# Patient Record
Sex: FEMALE | NOT HISPANIC OR LATINO | Employment: OTHER | URBAN - METROPOLITAN AREA
[De-identification: names, ages, dates, MRNs, and addresses within clinical notes are randomized per-mention and may not be internally consistent; named-entity substitution may affect disease eponyms.]

---

## 2024-08-13 ENCOUNTER — DOCUMENTATION ONLY (OUTPATIENT)
Dept: GERIATRICS | Facility: CLINIC | Age: 89
End: 2024-08-13

## 2024-08-15 ENCOUNTER — ASSISTED LIVING VISIT (OUTPATIENT)
Dept: GERIATRICS | Facility: CLINIC | Age: 89
End: 2024-08-15
Payer: MEDICARE

## 2024-08-15 VITALS
RESPIRATION RATE: 18 BRPM | HEART RATE: 66 BPM | SYSTOLIC BLOOD PRESSURE: 125 MMHG | DIASTOLIC BLOOD PRESSURE: 60 MMHG | OXYGEN SATURATION: 97 % | WEIGHT: 175 LBS

## 2024-08-15 DIAGNOSIS — Z71.89 ACP (ADVANCE CARE PLANNING): ICD-10-CM

## 2024-08-15 DIAGNOSIS — I48.0 PAROXYSMAL ATRIAL FIBRILLATION (H): Primary | ICD-10-CM

## 2024-08-15 DIAGNOSIS — I10 HYPERTENSION, UNSPECIFIED TYPE: ICD-10-CM

## 2024-08-15 DIAGNOSIS — M17.0 PRIMARY OSTEOARTHRITIS OF BOTH KNEES: ICD-10-CM

## 2024-08-15 DIAGNOSIS — I50.22 CHRONIC SYSTOLIC HEART FAILURE (H): ICD-10-CM

## 2024-08-15 DIAGNOSIS — I82.402 DEEP VEIN THROMBOSIS (DVT) OF LEFT LOWER EXTREMITY, UNSPECIFIED CHRONICITY, UNSPECIFIED VEIN (H): ICD-10-CM

## 2024-08-15 PROCEDURE — 99344 HOME/RES VST NEW MOD MDM 60: CPT | Performed by: NURSE PRACTITIONER

## 2024-08-15 RX ORDER — AMLODIPINE BESYLATE 5 MG/1
5 TABLET ORAL DAILY
COMMUNITY

## 2024-08-15 RX ORDER — ACETAMINOPHEN 325 MG/1
650 TABLET ORAL EVERY 8 HOURS PRN
COMMUNITY
End: 2024-08-15

## 2024-08-15 RX ORDER — CALCIUM CARBONATE/VITAMIN D3 600 MG-10
1 TABLET ORAL DAILY
COMMUNITY

## 2024-08-15 RX ORDER — POTASSIUM CHLORIDE 1500 MG/1
20 TABLET, EXTENDED RELEASE ORAL DAILY
COMMUNITY

## 2024-08-15 RX ORDER — DILTIAZEM HYDROCHLORIDE 240 MG/1
240 CAPSULE, EXTENDED RELEASE ORAL DAILY
COMMUNITY

## 2024-08-15 RX ORDER — METOPROLOL SUCCINATE 200 MG/1
200 TABLET, EXTENDED RELEASE ORAL DAILY
COMMUNITY
End: 2024-10-03

## 2024-08-15 RX ORDER — FUROSEMIDE 20 MG
20 TABLET ORAL DAILY
COMMUNITY

## 2024-08-15 RX ORDER — ACETAMINOPHEN 500 MG
500 TABLET ORAL EVERY MORNING
COMMUNITY

## 2024-08-15 NOTE — PROGRESS NOTES
[place] [facility]  PRIMARY CARE PROVIDER AND CLINIC:  Jossue Palomo, APRN CNP, [address] [facility] / [facility] [address]  Chief Complaint   Patient presents with    [facility]     [facility] Record Number:  [id number]  Place of Service where encounter took place:  [facility]  [facility] ([facility]) [[id number]]    Hilda Sol  is a 89 year old  (11/14/1931), admitted to the above facility from home..  Admitted to this facility for  medical management and nursing care.    HPI:    HPI information obtained from: facility chart records, facility staff, patient report, [facility] chart review, and family/first contact granddtr report.   Brief Summary of Hospital Course:     A-fib. HR stable. Has h/o of possible TIA, Neuros stable. Cont. On toprol xl, eliquis. No recent falls. Last hgb stable.     DVT. LLE s/p thrombectomy 8/21.  Has h/o LE cellulitis. Cont. On eliquis as above. Has venous insuff. Of LLE.     HTN: BP stable. Cont. On cardizem, norvasc. Last bmp stable.    HF: EF 60%. Resp. Status stable. Ongoing LE edema. Reports gradual wt. Loss. Cont. On lasix, kcl.     OA knees. Ongoing pain. Gen. Managed with tylenol, biofreeze. Reports knee inj. Not effective. Has slowed gait, favors LLE. Short steps. Slow to stand. Uses walker. When out of apt uses w.c.        Updates on Status Since Skilled nursing Admission: po intake stable. Occ overnight urinary incont.     CODE STATUS/ADVANCE DIRECTIVES DISCUSSION:   DNR / DNI  Patient's living condition: lives in an assisted living facility  ALLERGIES: Patient has no known allergies.  PAST MEDICAL HISTORY:  has a past medical history of Chronic systolic heart failure (H), CKD (chronic kidney disease) stage 3, GFR 30-59 ml/min (H), DVT (deep venous thrombosis) (H), HTN (hypertension), Paroxysmal atrial fibrillation (H), and Venous (peripheral) insufficiency.  PAST SURGICAL HISTORY:   has a past surgical history that includes  Thrombectomy lower extremity.  FAMILY HISTORY: family history is not on file.  SOCIAL HISTORY:       Post Discharge Medication Reconciliation Status: discharge medications reconciled and changed, per note/orders    Current Outpatient Medications   Medication Sig Dispense Refill    acetaminophen (TYLENOL) 500 MG tablet Take 500 mg by mouth every morning And every day prn      amLODIPine (NORVASC) 5 MG tablet Take 5 mg by mouth daily      apixaban ANTICOAGULANT (ELIQUIS) 5 MG tablet Take 5 mg by mouth 2 times daily      calcium carbonate-vitamin D (CALTRATE) 600-10 MG-MCG per tablet Take 1 tablet by mouth daily      diltiazem ER (DILT-XR) 240 MG 24 hr ER beaded capsule Take 240 mg by mouth daily      furosemide (LASIX) 20 MG tablet Take 20 mg by mouth daily      menthol (ICY HOT) 5 % PTCH Apply 1 patch topically as needed for muscle soreness      metoprolol succinate ER (TOPROL XL) 200 MG 24 hr tablet Take 200 mg by mouth daily      potassium chloride ER (K-TAB) 20 MEQ CR tablet Take 20 mEq by mouth daily           ROS:  CONSTITUTIONAL:  weight loss and fatigue, EYES:  decreased vision L eye, ENT:  [place], CV:  lower extremity edema, RESPIRATORY: neg, :  incontinence, GI:  neg, NEURO:  neg, PSYCH: neg, and MUSCULOSKELETAL: joint pain and muscular weakness    Vitals:  /60   Pulse 66   Resp 18   Wt 79.4 kg (175 lb)   SpO2 97%   Exam:  GENERAL APPEARANCE:  Alert, in no distress, cooperative  ENT:  Mouth and posterior oropharynx normal, moist mucous membranes, [place], adequate dentition  EYES:  PERRL, EOM normal, opacity to L eye. No drainage  NECK:  No adenopathy,masses or thyromegaly, no carotid bruit, FROM  RESP:  respiratory effort and palpation of chest normal, lungs clear to auscultation , no respiratory distress  CV:  Palpation and auscultation of heart done , peripheral edema 3+ in LEs, rate-normal, grade 2/6 murmur  ABDOMEN:  normal bowel sounds, soft, nontender, no hepatosplenomegaly or other masses, no  guarding or rebound, no bruits  M/S:   muscle strength 5/5 all 4 ext. With gen. LE weakness. Slow to stand. Slow, sl shuffling gait, favors LLE  NEURO:   Cranial nerves 2-12 are normal tested and grossly at patient's baseline, speech fluid, no tremor  PSYCH:  memory impaired , affect and mood normal, no apparent anxiety    Lab/Diagnostic data:  Recent labs in [facility] reviewed by me today.     ASSESSMENT/PLAN:  (I48.0) Paroxysmal atrial fibrillation (H)  (primary encounter diagnosis)  Comment: HR stable.  Plan: 1. Cont. Toprol xl  2. Follow Hrs  3. Monitor for dizziness, resp. Distress  4. Cbc next week    (I82.402) Deep vein thrombosis (DVT) of left lower extremity, unspecified chronicity, unspecified vein (H)  Comment: s/p thrombectomy, no redness of Les. Venous insuff. LLE  Plan: 1. Cont. Eliquis  2. Monitor for LE pain, redness    (I10) Hypertension, unspecified type  Comment: BP stable. CKD stage 3  Plan: 1. Cont. Cardizem  2. Cont. Norvasc  3. Follow Bps  4. Bmp next week    (I50.22) Chronic systolic heart failure (H)  Comment: resp. Status stable. Preserved EF. Ongoing LE edema  Plan: 1. Cont. Lasix, kcl  2. Refer to OT for compression stockings  3. Follow wt.s    (M17.0) Primary osteoarthritis of both knees  Comment: ongoing bilat knee pain, slowed gait  Plan: 1. Refer to PT  2. Cont. Tylenol, biofreeze  3. Encourage amb as chan    (Z71.89) ACP (advance care planning)  Comment: spoke with residentjules.  Plan: 1 DNR/DNI. POLST in chart          Electronically signed by:  DOLLY Moore CNP         Documentation of Face-to-Face and Certification for Home Health Services     Patient: Hilda Sol   YOB: 1931  MR Number: [id number]  Today's Date: 8/15/2024    I certify that patient: Hilda Sol is under my care and that I, or a nurse practitioner or physician's assistant working with me, had a face-to-face encounter that meets the physician face-to-face encounter  requirements with this patient on: 8/15/2024.    This encounter with the patient was in whole, or in part, for the following medical condition, which is the primary reason for home health care: OA knee, LE edema.    I certify that, based on my findings, the following services are medically necessary home health services: Occupational Therapy and Physical Therapy.    My clinical findings support the need for the above services because: Occupational Therapy Services are needed to assess and treat cognitive ability and address ADL safety due to impairment in LE edema. and Physical Therapy Services are needed to assess and treat the following functional impairments: knee pain, decreased amb.    Further, I certify that my clinical findings support that this patient is homebound (i.e. absences from home require considerable and taxing effort and are for medical reasons or [employer] services or infrequently or of short duration when for other reasons) because: Requires assistance of another person or specialized equipment to access medical services because patient: Is unable to walk greater than 20 feet without rest...    Based on the above findings. I certify that this patient is confined to the home and needs intermittent skilled nursing care, physical therapy and/or speech therapy.  The patient is under my care, and I have initiated the establishment of the plan of care.  This patient will be followed by a physician who will periodically review the plan of care.  Physician/Provider to provide follow up care: Jossue Palomo    Responsible Medicare certified [place] Physician: Ita Simpson  Physician Signature: See electronic signature associated with these discharge orders.  Date: 8/15/2024

## 2024-08-16 ENCOUNTER — LAB REQUISITION (OUTPATIENT)
Dept: LAB | Facility: CLINIC | Age: 89
End: 2024-08-16
Payer: MEDICARE

## 2024-08-16 DIAGNOSIS — I10 ESSENTIAL (PRIMARY) HYPERTENSION: ICD-10-CM

## 2024-08-20 LAB
ANION GAP SERPL CALCULATED.3IONS-SCNC: 14 MMOL/L (ref 7–15)
BUN SERPL-MCNC: 33.7 MG/DL (ref 8–23)
CALCIUM SERPL-MCNC: 9.6 MG/DL (ref 8.8–10.4)
CHLORIDE SERPL-SCNC: 104 MMOL/L (ref 98–107)
CREAT SERPL-MCNC: 1.21 MG/DL (ref 0.51–0.95)
EGFRCR SERPLBLD CKD-EPI 2021: 42 ML/MIN/1.73M2
ERYTHROCYTE [DISTWIDTH] IN BLOOD BY AUTOMATED COUNT: 12.9 % (ref 10–15)
GLUCOSE SERPL-MCNC: 95 MG/DL (ref 70–99)
HCO3 SERPL-SCNC: 23 MMOL/L (ref 22–29)
HCT VFR BLD AUTO: 41.4 % (ref 35–47)
HGB BLD-MCNC: 13.3 G/DL (ref 11.7–15.7)
MCH RBC QN AUTO: 32.5 PG (ref 26.5–33)
MCHC RBC AUTO-ENTMCNC: 32.1 G/DL (ref 31.5–36.5)
MCV RBC AUTO: 101 FL (ref 78–100)
PLATELET # BLD AUTO: 234 10E3/UL (ref 150–450)
POTASSIUM SERPL-SCNC: 5.1 MMOL/L (ref 3.4–5.3)
RBC # BLD AUTO: 4.09 10E6/UL (ref 3.8–5.2)
SODIUM SERPL-SCNC: 141 MMOL/L (ref 135–145)
WBC # BLD AUTO: 8.8 10E3/UL (ref 4–11)

## 2024-08-20 PROCEDURE — 85027 COMPLETE CBC AUTOMATED: CPT | Mod: ORL | Performed by: NURSE PRACTITIONER

## 2024-08-20 PROCEDURE — 36415 COLL VENOUS BLD VENIPUNCTURE: CPT | Mod: ORL | Performed by: NURSE PRACTITIONER

## 2024-08-20 PROCEDURE — 80048 BASIC METABOLIC PNL TOTAL CA: CPT | Mod: ORL | Performed by: NURSE PRACTITIONER

## 2024-08-20 PROCEDURE — P9603 ONE-WAY ALLOW PRORATED MILES: HCPCS | Mod: ORL | Performed by: NURSE PRACTITIONER

## 2024-08-31 DIAGNOSIS — Z78.9 TAKES DIETARY SUPPLEMENTS: Primary | ICD-10-CM

## 2024-09-03 RX ORDER — CALCIUM CARBONATE/VITAMIN D3 600MG-5MCG
1 TABLET ORAL DAILY
Qty: 90 TABLET | Refills: 97 | Status: SHIPPED | OUTPATIENT
Start: 2024-09-03

## 2024-10-03 DIAGNOSIS — I10 HTN (HYPERTENSION): ICD-10-CM

## 2024-10-03 DIAGNOSIS — I48.0 PAROXYSMAL ATRIAL FIBRILLATION (H): Primary | ICD-10-CM

## 2024-10-03 RX ORDER — METOPROLOL SUCCINATE 200 MG/1
200 TABLET, EXTENDED RELEASE ORAL DAILY
Qty: 90 TABLET | Refills: 3 | Status: SHIPPED | OUTPATIENT
Start: 2024-10-03

## 2024-10-03 RX ORDER — APIXABAN 5 MG/1
5 TABLET, FILM COATED ORAL 2 TIMES DAILY
Qty: 180 TABLET | Refills: 3 | Status: SHIPPED | OUTPATIENT
Start: 2024-10-03

## 2024-10-03 RX ORDER — POTASSIUM CHLORIDE 1500 MG/1
20 TABLET, EXTENDED RELEASE ORAL DAILY
Qty: 90 TABLET | Refills: 3 | Status: SHIPPED | OUTPATIENT
Start: 2024-10-03

## 2024-10-10 ENCOUNTER — ASSISTED LIVING VISIT (OUTPATIENT)
Dept: GERIATRICS | Facility: CLINIC | Age: 89
End: 2024-10-10
Payer: COMMERCIAL

## 2024-10-10 VITALS
DIASTOLIC BLOOD PRESSURE: 56 MMHG | RESPIRATION RATE: 18 BRPM | HEART RATE: 74 BPM | OXYGEN SATURATION: 95 % | SYSTOLIC BLOOD PRESSURE: 126 MMHG

## 2024-10-10 DIAGNOSIS — I50.22 CHRONIC SYSTOLIC HEART FAILURE (H): Primary | ICD-10-CM

## 2024-10-10 DIAGNOSIS — M62.81 GENERALIZED MUSCLE WEAKNESS: ICD-10-CM

## 2024-10-10 DIAGNOSIS — I48.0 PAROXYSMAL ATRIAL FIBRILLATION (H): ICD-10-CM

## 2024-10-10 PROCEDURE — 99349 HOME/RES VST EST MOD MDM 40: CPT | Performed by: NURSE PRACTITIONER

## 2024-10-10 NOTE — LETTER
10/10/2024      Hilda Sol  [address]  [place] [address]        [place] [facility]  [facility] Record Number:  [id number]  Place of Service where encounter took place:  [facility]  [facility] ([facility]) [[id number]]  Chief Complaint   Patient presents with     Edema       HPI:    Hilda Sol  is a 89 year old (11/14/1931), who is being seen today for an episodic care visit.  HPI information obtained from: facility chart records, facility staff, patient report, and [facility] chart review. Today's concern is:  HF, a-fib, weakness. +Covid earlier this month. Cont. On isolation. Has had some increased weakness. Fall last week. No apparent inj. Reports strength more baseline. Completed paxlovid course. For a-fib cont. On eliquis, toprol xl. HR stable. No reports of dizziness.     Past Medical and Surgical History reviewed in Epic today.    MEDICATIONS:    Current Outpatient Medications   Medication Sig Dispense Refill     acetaminophen (TYLENOL) 500 MG tablet Take 500 mg by mouth every morning And every day prn       amLODIPine (NORVASC) 5 MG tablet Take 5 mg by mouth daily       CALCIUM + VITAMIN D3 600-5 MG-MCG TABS TAKE 1 TABLET BY MOUTH ONCE DAILY 90 tablet 97     calcium carbonate-vitamin D (CALTRATE) 600-10 MG-MCG per tablet Take 1 tablet by mouth daily       diltiazem ER (DILT-XR) 240 MG 24 hr ER beaded capsule Take 240 mg by mouth daily       ELIQUIS ANTICOAGULANT 5 MG tablet TAKE 1 TABLET BY MOUTH TWICE DAILY 180 tablet 3     furosemide (LASIX) 20 MG tablet Take 20 mg by mouth daily       menthol (ICY HOT) 5 % PTCH Apply 1 patch topically as needed for muscle soreness       metoprolol succinate ER (TOPROL XL) 200 MG 24 hr tablet TAKE 1 TABLET BY MOUTH ONCE DAILY 90 tablet 3     potassium chloride angelika ER (KLOR-CON M20) 20 MEQ CR tablet TAKE 1 TABLET BY MOUTH ONCE DAILY 90 tablet 3         REVIEW OF SYSTEMS:  No chest pain, shortness of breath, fevers, chills,  headache, nausea, vomiting, dysuria or bowel abnormalities.  Appetite is  normal.  No pain except occ aches.    Objective:  /56   Pulse 74   Resp 18   SpO2 95%   Exam:  GENERAL APPEARANCE:  Alert, in no distress, cooperative  ENT:  Mouth and posterior oropharynx normal, moist mucous membranes, [place]  EYES:  PERRL, EOM normal, R eye lower lid ectropion   RESP:  respiratory effort and palpation of chest normal, lungs clear to auscultation , no respiratory distress, diminished breath sounds bibasilar  CV:  Palpation and auscultation of heart done , peripheral edema 1-2+ in LEs, rate-normal, grade 3/6 murmur  ABDOMEN:  normal bowel sounds, soft, nontender, no hepatosplenomegaly or other masses, no guarding or rebound  M/S:   muscle strength 5/5 all 4 ext., normal tone  NEURO:   Cranial nerves 2-12 are normal tested and grossly at patient's baseline, speech fluid  PSYCH:  affect and mood normal, no apparent anxiety    Labs:   Most Recent 3 CBC's:  Recent Labs   Lab Test 08/20/24  1116   WBC 8.8   HGB 13.3   *        Most Recent 3 BMP's:  Recent Labs   Lab Test 08/20/24  1116      POTASSIUM 5.1   CHLORIDE 104   CO2 23   BUN 33.7*   CR 1.21*   ANIONGAP 14   NIK 9.6   GLC 95       ASSESSMENT/PLAN:  (I50.22) Chronic systolic heart failure (H)  (primary encounter diagnosis)  Comment: LE edema stable. O2 sats stable.  Plan: 1. Cont. Lasix  2. Elevate Les  3. Follow Bps, wt.s    (I48.0) Paroxysmal atrial fibrillation (H)  Comment: HR stable.  Plan: 1. Cont. Eliquis, toprol xl  2. Follow Hrs  3. Bmp next week  4. Monitor for dizziness    (M62.81) Generalized muscle weakness  Comment: fall last week. Strength improving. Completed paxlovid course for covid.   Plan: 1. Encourage amb with walker as chan  2. Monitor for pain, decreased mobility              Electronically signed by:  DOLLY Moore CNP              Sincerely,        DOLLY Moore CNP

## 2024-10-10 NOTE — PROGRESS NOTES
[place] [facility]  [facility] Record Number:  [id number]  Place of Service where encounter took place:  [facility]  [facility] ([facility]) [[id number]]  Chief Complaint   Patient presents with    Edema       HPI:    Hilda Sol  is a 89 year old (11/14/1931), who is being seen today for an episodic care visit.  HPI information obtained from: facility chart records, facility staff, patient report, and [facility] chart review. Today's concern is:  HF, a-fib, weakness. +Covid earlier this month. Cont. On isolation. Has had some increased weakness. Fall last week. No apparent inj. Reports strength more baseline. Completed molnupiravir course. For a-fib cont. On eliquis, toprol xl. HR stable. No reports of dizziness.     Past Medical and Surgical History reviewed in Epic today.    MEDICATIONS:    Current Outpatient Medications   Medication Sig Dispense Refill    acetaminophen (TYLENOL) 500 MG tablet Take 500 mg by mouth every morning And every day prn      amLODIPine (NORVASC) 5 MG tablet Take 5 mg by mouth daily      CALCIUM + VITAMIN D3 600-5 MG-MCG TABS TAKE 1 TABLET BY MOUTH ONCE DAILY 90 tablet 97    calcium carbonate-vitamin D (CALTRATE) 600-10 MG-MCG per tablet Take 1 tablet by mouth daily      diltiazem ER (DILT-XR) 240 MG 24 hr ER beaded capsule Take 240 mg by mouth daily      ELIQUIS ANTICOAGULANT 5 MG tablet TAKE 1 TABLET BY MOUTH TWICE DAILY 180 tablet 3    furosemide (LASIX) 20 MG tablet Take 20 mg by mouth daily      menthol (ICY HOT) 5 % PTCH Apply 1 patch topically as needed for muscle soreness      metoprolol succinate ER (TOPROL XL) 200 MG 24 hr tablet TAKE 1 TABLET BY MOUTH ONCE DAILY 90 tablet 3    potassium chloride angelika ER (KLOR-CON M20) 20 MEQ CR tablet TAKE 1 TABLET BY MOUTH ONCE DAILY 90 tablet 3         REVIEW OF SYSTEMS:  No chest pain, shortness of breath, fevers, chills, headache, nausea, vomiting, dysuria or bowel abnormalities.  Appetite is  normal.  No  pain except occ aches.    Objective:  /56   Pulse 74   Resp 18   SpO2 95%   Exam:  GENERAL APPEARANCE:  Alert, in no distress, cooperative  ENT:  Mouth and posterior oropharynx normal, moist mucous membranes, [place]  EYES:  PERRL, EOM normal, R eye lower lid ectropion   RESP:  respiratory effort and palpation of chest normal, lungs clear to auscultation , no respiratory distress, diminished breath sounds bibasilar  CV:  Palpation and auscultation of heart done , peripheral edema 1-2+ in LEs, rate-normal, grade 3/6 murmur  ABDOMEN:  normal bowel sounds, soft, nontender, no hepatosplenomegaly or other masses, no guarding or rebound  M/S:   muscle strength 5/5 all 4 ext., normal tone  NEURO:   Cranial nerves 2-12 are normal tested and grossly at patient's baseline, speech fluid  PSYCH:  affect and mood normal, no apparent anxiety    Labs:   Most Recent 3 CBC's:  Recent Labs   Lab Test 08/20/24  1116   WBC 8.8   HGB 13.3   *        Most Recent 3 BMP's:  Recent Labs   Lab Test 08/20/24  1116      POTASSIUM 5.1   CHLORIDE 104   CO2 23   BUN 33.7*   CR 1.21*   ANIONGAP 14   NIK 9.6   GLC 95       ASSESSMENT/PLAN:  (I50.22) Chronic systolic heart failure (H)  (primary encounter diagnosis)  Comment: LE edema stable. O2 sats stable.  Plan: 1. Cont. Lasix  2. Elevate Les  3. Follow Bps, wt.s    (I48.0) Paroxysmal atrial fibrillation (H)  Comment: HR stable.  Plan: 1. Cont. Eliquis, toprol xl  2. Follow Hrs  3. Bmp next week  4. Monitor for dizziness    (M62.81) Generalized muscle weakness  Comment: fall last week. Strength improving. Completed molnupiravir course for covid.   Plan: 1. Encourage amb with walker as chan  2. Monitor for pain, decreased mobility              Electronically signed by:  DOLLY Moore CNP

## 2024-10-11 ENCOUNTER — LAB REQUISITION (OUTPATIENT)
Dept: LAB | Facility: CLINIC | Age: 89
End: 2024-10-11
Payer: COMMERCIAL

## 2024-10-11 DIAGNOSIS — I10 ESSENTIAL (PRIMARY) HYPERTENSION: ICD-10-CM

## 2024-10-15 LAB — GLUCOSE SERPL-MCNC: 125 MG/DL (ref 70–99)

## 2024-10-15 PROCEDURE — 36415 COLL VENOUS BLD VENIPUNCTURE: CPT | Mod: ORL | Performed by: NURSE PRACTITIONER

## 2024-10-15 PROCEDURE — P9604 ONE-WAY ALLOW PRORATED TRIP: HCPCS | Mod: ORL | Performed by: NURSE PRACTITIONER

## 2024-10-15 PROCEDURE — 80048 BASIC METABOLIC PNL TOTAL CA: CPT | Mod: ORL | Performed by: NURSE PRACTITIONER

## 2024-10-16 LAB
ANION GAP SERPL CALCULATED.3IONS-SCNC: 13 MMOL/L (ref 7–15)
BUN SERPL-MCNC: 26.8 MG/DL (ref 8–23)
CALCIUM SERPL-MCNC: 10.1 MG/DL (ref 8.8–10.4)
CHLORIDE SERPL-SCNC: 101 MMOL/L (ref 98–107)
CREAT SERPL-MCNC: 1.25 MG/DL (ref 0.51–0.95)
EGFRCR SERPLBLD CKD-EPI 2021: 40 ML/MIN/1.73M2
HCO3 SERPL-SCNC: 26 MMOL/L (ref 22–29)
POTASSIUM SERPL-SCNC: 4.6 MMOL/L (ref 3.4–5.3)
SODIUM SERPL-SCNC: 140 MMOL/L (ref 135–145)

## 2024-10-28 DIAGNOSIS — I48.0 PAROXYSMAL ATRIAL FIBRILLATION (H): Primary | ICD-10-CM

## 2024-10-28 RX ORDER — FUROSEMIDE 20 MG/1
20 TABLET ORAL DAILY
Qty: 90 TABLET | Refills: 3 | Status: SHIPPED | OUTPATIENT
Start: 2024-10-28

## 2024-10-28 RX ORDER — DILTIAZEM HYDROCHLORIDE 240 MG/1
240 CAPSULE, EXTENDED RELEASE ORAL DAILY
Qty: 90 CAPSULE | Refills: 97 | Status: SHIPPED | OUTPATIENT
Start: 2024-10-28

## 2024-10-28 RX ORDER — AMLODIPINE BESYLATE 5 MG/1
5 TABLET ORAL DAILY
Qty: 90 TABLET | Refills: 97 | Status: SHIPPED | OUTPATIENT
Start: 2024-10-28

## 2025-02-10 ENCOUNTER — LAB REQUISITION (OUTPATIENT)
Dept: LAB | Facility: CLINIC | Age: OVER 89
End: 2025-02-10
Payer: COMMERCIAL

## 2025-02-10 ENCOUNTER — ASSISTED LIVING VISIT (OUTPATIENT)
Dept: GERIATRICS | Facility: CLINIC | Age: OVER 89
End: 2025-02-10
Payer: COMMERCIAL

## 2025-02-10 VITALS
BODY MASS INDEX: 29.23 KG/M2 | SYSTOLIC BLOOD PRESSURE: 142 MMHG | WEIGHT: 165 LBS | HEIGHT: 63 IN | RESPIRATION RATE: 20 BRPM | OXYGEN SATURATION: 93 % | DIASTOLIC BLOOD PRESSURE: 63 MMHG | HEART RATE: 74 BPM | TEMPERATURE: 95.5 F

## 2025-02-10 DIAGNOSIS — M62.81 GENERALIZED MUSCLE WEAKNESS: ICD-10-CM

## 2025-02-10 DIAGNOSIS — I10 BENIGN ESSENTIAL HYPERTENSION: ICD-10-CM

## 2025-02-10 DIAGNOSIS — I10 ESSENTIAL (PRIMARY) HYPERTENSION: ICD-10-CM

## 2025-02-10 DIAGNOSIS — I50.32 CHRONIC HEART FAILURE WITH PRESERVED EJECTION FRACTION (H): ICD-10-CM

## 2025-02-10 DIAGNOSIS — I48.0 PAROXYSMAL ATRIAL FIBRILLATION (H): ICD-10-CM

## 2025-02-10 DIAGNOSIS — M17.0 PRIMARY OSTEOARTHRITIS OF BOTH KNEES: ICD-10-CM

## 2025-02-10 DIAGNOSIS — N18.32 STAGE 3B CHRONIC KIDNEY DISEASE (H): ICD-10-CM

## 2025-02-10 DIAGNOSIS — I67.9 CEREBRAL VASCULAR DISEASE: ICD-10-CM

## 2025-02-10 DIAGNOSIS — I13.0 HYPERTENSIVE HEART AND CHRONIC KIDNEY DISEASE WITH HEART FAILURE AND STAGE 1 THROUGH STAGE 4 CHRONIC KIDNEY DISEASE, OR UNSPECIFIED CHRONIC KIDNEY DISEASE (H): Primary | ICD-10-CM

## 2025-02-10 PROCEDURE — 99349 HOME/RES VST EST MOD MDM 40: CPT | Performed by: INTERNAL MEDICINE

## 2025-02-10 NOTE — LETTER
2/10/2025      Hilda RODRIGUEZ [facility]  [address]  [place] [address]        No notes on file      Sincerely,        Ita Simpson MD    Electronically signed

## 2025-02-11 LAB
ANION GAP SERPL CALCULATED.3IONS-SCNC: 14 MMOL/L (ref 7–15)
BUN SERPL-MCNC: 25.7 MG/DL (ref 8–23)
CALCIUM SERPL-MCNC: 9.9 MG/DL (ref 8.8–10.4)
CHLORIDE SERPL-SCNC: 105 MMOL/L (ref 98–107)
CREAT SERPL-MCNC: 1.12 MG/DL (ref 0.51–0.95)
EGFRCR SERPLBLD CKD-EPI 2021: 46 ML/MIN/1.73M2
ERYTHROCYTE [DISTWIDTH] IN BLOOD BY AUTOMATED COUNT: 13 % (ref 10–15)
GLUCOSE SERPL-MCNC: 97 MG/DL (ref 70–99)
HCO3 SERPL-SCNC: 22 MMOL/L (ref 22–29)
HCT VFR BLD AUTO: 42.2 % (ref 35–47)
HGB BLD-MCNC: 13.8 G/DL (ref 11.7–15.7)
MCH RBC QN AUTO: 31.9 PG (ref 26.5–33)
MCHC RBC AUTO-ENTMCNC: 32.7 G/DL (ref 31.5–36.5)
MCV RBC AUTO: 98 FL (ref 78–100)
PLATELET # BLD AUTO: 264 10E3/UL (ref 150–450)
POTASSIUM SERPL-SCNC: 5.1 MMOL/L (ref 3.4–5.3)
RBC # BLD AUTO: 4.33 10E6/UL (ref 3.8–5.2)
SODIUM SERPL-SCNC: 141 MMOL/L (ref 135–145)
WBC # BLD AUTO: 10.7 10E3/UL (ref 4–11)

## 2025-02-12 DIAGNOSIS — I48.0 PAROXYSMAL ATRIAL FIBRILLATION (H): Primary | ICD-10-CM

## 2025-03-08 ENCOUNTER — LAB REQUISITION (OUTPATIENT)
Dept: LAB | Facility: CLINIC | Age: OVER 89
End: 2025-03-08
Payer: COMMERCIAL

## 2025-03-08 DIAGNOSIS — A04.72 ENTEROCOLITIS DUE TO CLOSTRIDIUM DIFFICILE, NOT SPECIFIED AS RECURRENT: ICD-10-CM

## 2025-03-09 ENCOUNTER — LAB REQUISITION (OUTPATIENT)
Dept: LAB | Facility: CLINIC | Age: OVER 89
End: 2025-03-09
Payer: MEDICARE

## 2025-03-09 DIAGNOSIS — A04.72 ENTEROCOLITIS DUE TO CLOSTRIDIUM DIFFICILE, NOT SPECIFIED AS RECURRENT: ICD-10-CM

## 2025-03-09 LAB — C DIFF TOX B STL QL: NEGATIVE

## 2025-03-09 PROCEDURE — 87493 C DIFF AMPLIFIED PROBE: CPT | Mod: ORL | Performed by: NURSE PRACTITIONER

## 2025-03-10 PROBLEM — I13.0 HYPERTENSIVE HEART AND CHRONIC KIDNEY DISEASE WITH HEART FAILURE AND STAGE 1 THROUGH STAGE 4 CHRONIC KIDNEY DISEASE, OR UNSPECIFIED CHRONIC KIDNEY DISEASE (H): Status: ACTIVE | Noted: 2025-03-10

## 2025-03-10 NOTE — PROGRESS NOTES
"Hilda Sol is a 89 year old female seen February 10, 2025 at [facility] where she has resided for 6 months (admit 8/2024) seen in her apartment with her  Hany and granddaughter Whitley present.   Pt is seen up to recliner, ambulates there with 4WW.   Notes arthritis in her knees, limits mobility.   She had a case of shingles 2 weeks ago, granddaughter has brought in lidocaine ointment for pain   Pt fairly sedentary at baseline   No pain other than knees, eats/sleeps okay     By chart review, pt has a h/o PAF since 2019 anticoagulated with apixaban, with history of TIA when off warfarin in 2021, and lacunar infarct on imaging.   She had a Left LE DVT s/p thrombectomy in 2021    Also treated for HTN and HFpEF  (EF 60 in 2021)     She had symptomatic COVID19 infection in October 2024, treated with molnupiravir.        Past Medical History:   Diagnosis Date    Chronic systolic heart failure (H)     CKD (chronic kidney disease) stage 3, GFR 30-59 ml/min (H)     DVT (deep venous thrombosis) (H)     HTN (hypertension)     Paroxysmal atrial fibrillation (H)     Venous (peripheral) insufficiency  Cerebral vascular disease     Lacunar infarct       Past Surgical History:   Procedure Laterality Date    THROMBECTOMY LOWER EXTREMITY       SH:  Moved with her  Hany to [place] from [place], [place]   Son Emanuel is an anesthesiologist  Granddaughters Whitley and Louise are nurses and help out.   Non smoker   Pt enjoys quilting.     ROS:  Ambulatory with 4WW   Weight in 2021 was 190 lbs  Wt Readings from Last 5 Encounters:   02/10/25 74.8 kg (165 lb)   08/15/24 79.4 kg (175 lb)      GENERAL APPEARANCE: alert and no distress  BP (!) 142/63   Pulse 74   Temp (!) 95.5  F (35.3  C)   Resp 20   Ht 1.6 m (5' 3\")   Wt 74.8 kg (165 lb)   SpO2 93%   BMI 29.23 kg/m     HEENT: normocephalic, right eye ectropion, reddened   NECK: no adenopathy, no asymmetry, masses, or scars and thyroid normal to palpation  RESP: " lungs clear to auscultation - no rales, rhonchi or wheezes  CV: regular rate and rhythm, normal S1 S2, II/VI REINALDO   ABDOMEN:  soft, nontender, no HSM or masses and bowel sounds normal  MS: extremities normal- 1-2+ LE edema      SKIN: no suspicious lesions or rashes  NEURO: Normal strength and tone, sensory exam grossly normal, and speech normal  Slow steady gait with walker   PSYCH: affect okay, pleasant   LYMPHATICS: No cervical,  or supraclavicular nodes     Labs reviewed:  10/2024  Na 140  K 4.6  CO2 26   BUN/Cr 27/1.25   GFR 40-42      MR HEAD BRAIN WWO, MR VENOGRAM HEAD W/WO CONTRAST 9/9/2021   INDICATION: episode of speech difficulty today- history of cerebral venous thrombosis 11/2019- off warfarin since June - to be done tomorrow.   HEAD MRI:   INTRACRANIAL CONTENTS: There is presumed artifact subarachnoid CSF spaces the cerebral hemispheres on the FLAIR sequences. The focus of restricted effusion at the postcentral gyrus near the vertex on the right side. No mass, acute hemorrhage, or extra-axial fluid collections. Patchy and confluent nonspecific T2/FLAIR hyperintensities within the cerebral white matter most consistent with moderate chronic microvascular ischemic change. Chronic lacunar infarct left cerebellar hemisphere. Mild to moderate generalized cerebral atrophy. No hydrocephalus. Normal position of the cerebellar tonsils. No pathologic contrast enhancement.   HEAD MRV:   DURAL SINUSES: Further recanalization noted thrombosis of the lateral left transverse and proximal left sigmoid sinuses, with greater patency than on 2/18/2020. There is some residual nonocclusive thrombus in this locale. Dural sinuses are otherwise negative. Dominant right and smaller left transverse and sigmoid dural sinuses.   IMPRESSION:   HEAD MRI:   1.  Punctate focus of acute to early subacute ischemic infarction in the cortex of the postcentral gyrus on the right near the vertex.   2.  Age-related changes with a chronic  lacunar infarct in the left cerebellar hemisphere.   HEAD MRV:   1.  No evidence of acute occlusive venous sinus thrombosis.   2.  Nonocclusive chronic thrombus within the lateral transverse and proximal left sigmoid sinuses, with improved patency when compared to 2/18/2020.     Transthoracic echocardiogram 9/24/19:  Normal LV size and systolic function.       Normal RV size and function.   Mild LA dilatation.       Mild to moderate tricuspid, mild mitral, and mild aortic regurgitation.   No gross pericardial effusion.          Normal IVC.   No prior echo available for comparison.   Rapid/irregular rhythm during study.   Left Ventricular Ejection Fraction: 65 %       IMP/PLAN:   (I13.0) Hypertensive heart and chronic kidney disease with heart failure and stage 1 through stage 4 chronic kidney disease, or unspecified chronic kidney disease (H)    (I50.32) Chronic heart failure with preserved ejection fraction (H)  Comment: mild LE edema, lungs are clear   Plan: compression, elevation   Furosemide 20 mg/day with KCl 20 mEq/day     Follow exam and BMP       (I48.0) Paroxysmal atrial fibrillation (H)  (I67.9) Cerebral vascular disease  Comment:   Pulse Readings from Last 4 Encounters:   02/10/25 74   10/10/24 74   08/15/24 66      Plan: diltiazem 240 mg /day and metoprolol  mg/day for VR control   Apixaban 5 mg bid for stroke prophylaxis   Follow up with Cardiology as scheduled        (N18.32) Stage 3b chronic kidney disease (H)  (I10) Benign essential hypertension  Comment: SBPs 120s   Plan: decrease amlodipine to 2.5 mg/day >>>goal would be to get pt off 2 calcium channel blockers given edema and fall risk    Continue diltiazem 240 mg /day and metoprolol  mg/day   Renal dosing of medications       (M17.0) Primary osteoarthritis of both knees  Comment: limits mobility   Plan: acetaminophen 500 mg/day and PRN, Biofreeze gel locally   Ambulation with 4WW, WC for distance       (M62.81) Generalized muscle  weakness  Comment: deconditioning     Plan: [place] support for med admin, meals, activity     Ectropion   Comment: right eye  Plan Maxitrol 0.1% ointment at HS prn     Advance directive: DNR/DNI per signed ALEYDA Simpson MD

## 2025-03-13 DIAGNOSIS — I10 HYPERTENSION, UNSPECIFIED TYPE: ICD-10-CM

## 2025-03-13 DIAGNOSIS — R52 PAIN: Primary | ICD-10-CM

## 2025-03-13 RX ORDER — AMLODIPINE BESYLATE 2.5 MG/1
2.5 TABLET ORAL DAILY
Qty: 30 TABLET | Refills: 4 | Status: SHIPPED | OUTPATIENT
Start: 2025-03-13 | End: 2025-09-09

## 2025-03-13 RX ORDER — PSEUDOEPHED/ACETAMINOPH/DIPHEN 30MG-500MG
TABLET ORAL
Qty: 180 TABLET | Refills: 97 | Status: SHIPPED | OUTPATIENT
Start: 2025-03-13

## 2025-03-17 ENCOUNTER — ASSISTED LIVING VISIT (OUTPATIENT)
Dept: GERIATRICS | Facility: CLINIC | Age: OVER 89
End: 2025-03-17
Payer: COMMERCIAL

## 2025-03-17 VITALS
RESPIRATION RATE: 18 BRPM | HEART RATE: 68 BPM | OXYGEN SATURATION: 96 % | BODY MASS INDEX: 29.23 KG/M2 | DIASTOLIC BLOOD PRESSURE: 59 MMHG | SYSTOLIC BLOOD PRESSURE: 117 MMHG | HEIGHT: 63 IN

## 2025-03-17 DIAGNOSIS — R19.7 NAUSEA, VOMITING AND DIARRHEA: ICD-10-CM

## 2025-03-17 DIAGNOSIS — I10 BENIGN ESSENTIAL HYPERTENSION: Primary | ICD-10-CM

## 2025-03-17 DIAGNOSIS — I50.32 CHRONIC HEART FAILURE WITH PRESERVED EJECTION FRACTION (H): ICD-10-CM

## 2025-03-17 DIAGNOSIS — R11.2 NAUSEA, VOMITING AND DIARRHEA: ICD-10-CM

## 2025-03-17 PROCEDURE — 99349 HOME/RES VST EST MOD MDM 40: CPT | Performed by: NURSE PRACTITIONER

## 2025-03-17 NOTE — LETTER
3/17/2025      Hilda Sol  [address]  [place] [address]        [facility]  [facility] Record Number:  [id number]  [facility] ([facility]) [[id number]]  Chief Complaint   Patient presents with     Hypertension       HPI:    Hilda Sol  is a 89 year old (11/14/1931), who is being seen today for an episodic care visit.  HPI information obtained from: facility chart records, facility staff, patient report, and [facility] chart review. Today's concern is: HTN, n/v/d, HF. 3/6/25 reported nausea, diarrhea. Norovirus in facility. Spouse hosp. With c-diff.  Res. Had neg c-diff 3/6/25. Reports GI s/s now resolved. Last month had decrease in norvasc. BP stable. No increase in LE edema. Cont. On lasix for HF. Reports resp. Status stable.        Past Medical and Surgical History reviewed in Epic today.    MEDICATIONS:    Current Outpatient Medications   Medication Sig Dispense Refill     acetaminophen (TYLENOL) 500 MG tablet TAKE 1 TABLET BY MOUTH ONCE DAILY;AND TAKE ONE TABLET DAILY AS NEEDED FOR PAIN 180 tablet 97     CALCIUM + VITAMIN D3 600-5 MG-MCG TABS TAKE 1 TABLET BY MOUTH ONCE DAILY 90 tablet 97     calcium carbonate-vitamin D (CALTRATE) 600-10 MG-MCG per tablet Take 1 tablet by mouth daily       DILT- MG 24 hr ER beaded capsule TAKE 1 CAPSULE BY MOUTH ONCE DAILY 90 capsule 97     ELIQUIS ANTICOAGULANT 5 MG tablet TAKE 1 TABLET BY MOUTH TWICE DAILY 180 tablet 3     furosemide (LASIX) 20 MG tablet TAKE 1 TABLET BY MOUTH ONCE DAILY 90 tablet 3     menthol (ICY HOT) 5 % PTCH Apply 1 patch topically as needed for muscle soreness       metoprolol succinate ER (TOPROL XL) 200 MG 24 hr tablet TAKE 1 TABLET BY MOUTH ONCE DAILY 90 tablet 3     potassium chloride angelika ER (KLOR-CON M20) 20 MEQ CR tablet TAKE 1 TABLET BY MOUTH ONCE DAILY 90 tablet 3         REVIEW OF SYSTEMS:  CONSTITUTIONAL:  neg, EYES:  ectropion R eye, ENT:   "neg, CV:  lower extremity edema, RESPIRATORY: neg, :  neg, GI:  neg, NEURO:  neg, PSYCH: neg, and MUSCULOSKELETAL: neg    Objective:  /59   Pulse 68   Resp 18   Ht 1.6 m (5' 3\")   SpO2 96%   BMI 29.23 kg/m    Exam:  GENERAL APPEARANCE:  Alert, in no distress, cooperative  ENT:  Mouth and posterior oropharynx normal, moist mucous membranes, normal hearing acuity, no rhinorrhea  EYES:  EOM, conjunctivae, lids, pupils and irises normal, PERRL  RESP:  respiratory effort and palpation of chest normal, lungs clear to auscultation , no respiratory distress  CV:  Palpation and auscultation of heart done , regular rate and rhythm, no murmur, rub, or gallop, peripheral edema 1-2+ in LEs  ABDOMEN:  normal bowel sounds, soft, nontender, no hepatosplenomegaly or other masses, no guarding or rebound  M/S:   muscle strength 5/5 all 4 ext. Normal tone  NEURO:   Cranial nerves 2-12 are normal tested and grossly at patient's baseline, no tremor  PSYCH:  affect and mood normal, no apparent anxiety    Labs:   Most Recent 3 CBC's:  Recent Labs   Lab Test 02/11/25  1201 08/20/24  1116   WBC 10.7 8.8   HGB 13.8 13.3   MCV 98 101*    234     Most Recent 3 BMP's:  Recent Labs   Lab Test 02/11/25  1201 10/15/24  1336 08/20/24  1116    140 141   POTASSIUM 5.1 4.6 5.1   CHLORIDE 105 101 104   CO2 22 26 23   BUN 25.7* 26.8* 33.7*   CR 1.12* 1.25* 1.21*   ANIONGAP 14 13 14   NIK 9.9 10.1 9.6   GLC 97 125* 95       ASSESSMENT/PLAN:  (I10) Benign essential hypertension  (primary encounter diagnosis)  Comment: BP stable. Recent decrease in norvasc dose  Plan: 1. Discontinue norvasc  2. Cont. Cardizem, toprol xl  3. Follow Bps, Hrs  4. Cbc, bmp in next 1-3 mos    (R11.2,  R19.7) Nausea, vomiting and diarrhea  Comment: currently resolved, suspect viral gastroenteritis. C-diff neg. 3/6/25  Plan: 1. Encourage fluids  2. Monitor for n/v/d    (I50.32) Chronic heart failure with preserved ejection fraction (H)  Comment: LE edema " stable. Resp. Status stable  Plan: 1. Cont. Lasix  2. Follow wt.s  3. Elevate Les  4. Monitor for sob, follow O2 sats              Electronically signed by:  DOLLY Moore CNP              Sincerely,        DOLLY Moore CNP    Electronically signed

## 2025-03-17 NOTE — PROGRESS NOTES
[place] [facility]  [place] [facility] Record Number:  [id number]  [facility] [[id number][facility]  Chief Complaint   Patient presents with    Hypertension       HPI:    Hilda Sol  is a 89 year old (11/14/1931), who is being seen today for an episodic care visit.  HPI information obtained from: facility chart records, facility staff, patient report, and [facility] chart review. Today's concern is: HTN, n/v/d, HF. 3/6/25 reported nausea, diarrhea. Norovirus in facility. Spouse hosp. With c-diff.  Res. Had neg c-diff 3/6/25. Reports GI s/s now resolved. Last month had decrease in norvasc. BP stable. No increase in LE edema. Cont. On lasix for HF. Reports resp. Status stable.        Past Medical and Surgical History reviewed in Epic today.    MEDICATIONS:    Current Outpatient Medications   Medication Sig Dispense Refill    acetaminophen (TYLENOL) 500 MG tablet TAKE 1 TABLET BY MOUTH ONCE DAILY;AND TAKE ONE TABLET DAILY AS NEEDED FOR PAIN 180 tablet 97    CALCIUM + VITAMIN D3 600-5 MG-MCG TABS TAKE 1 TABLET BY MOUTH ONCE DAILY 90 tablet 97    calcium carbonate-vitamin D (CALTRATE) 600-10 MG-MCG per tablet Take 1 tablet by mouth daily      DILT- MG 24 hr ER beaded capsule TAKE 1 CAPSULE BY MOUTH ONCE DAILY 90 capsule 97    ELIQUIS ANTICOAGULANT 5 MG tablet TAKE 1 TABLET BY MOUTH TWICE DAILY 180 tablet 3    furosemide (LASIX) 20 MG tablet TAKE 1 TABLET BY MOUTH ONCE DAILY 90 tablet 3    menthol (ICY HOT) 5 % PTCH Apply 1 patch topically as needed for muscle soreness      metoprolol succinate ER (TOPROL XL) 200 MG 24 hr tablet TAKE 1 TABLET BY MOUTH ONCE DAILY 90 tablet 3    potassium chloride angelika ER (KLOR-CON M20) 20 MEQ CR tablet TAKE 1 TABLET BY MOUTH ONCE DAILY 90 tablet 3         REVIEW OF SYSTEMS:  CONSTITUTIONAL:  neg, EYES:  ectropion R eye, ENT:  neg, CV:  lower extremity edema, RESPIRATORY: neg, :  neg, GI:  neg, NEURO:  neg,  "PSYCH: neg, and MUSCULOSKELETAL: neg    Objective:  /59   Pulse 68   Resp 18   Ht 1.6 m (5' 3\")   SpO2 96%   BMI 29.23 kg/m    Exam:  GENERAL APPEARANCE:  Alert, in no distress, cooperative  ENT:  Mouth and posterior oropharynx normal, moist mucous membranes, normal hearing acuity, no rhinorrhea  EYES:  EOM, conjunctivae, lids, pupils and irises normal, PERRL  RESP:  respiratory effort and palpation of chest normal, lungs clear to auscultation , no respiratory distress  CV:  Palpation and auscultation of heart done , regular rate and rhythm, no murmur, rub, or gallop, peripheral edema 1-2+ in LEs  ABDOMEN:  normal bowel sounds, soft, nontender, no hepatosplenomegaly or other masses, no guarding or rebound  M/S:   muscle strength 5/5 all 4 ext. Normal tone  NEURO:   Cranial nerves 2-12 are normal tested and grossly at patient's baseline, no tremor  PSYCH:  affect and mood normal, no apparent anxiety    Labs:   Most Recent 3 CBC's:  Recent Labs   Lab Test 02/11/25  1201 08/20/24  1116   WBC 10.7 8.8   HGB 13.8 13.3   MCV 98 101*    234     Most Recent 3 BMP's:  Recent Labs   Lab Test 02/11/25  1201 10/15/24  1336 08/20/24  1116    140 141   POTASSIUM 5.1 4.6 5.1   CHLORIDE 105 101 104   CO2 22 26 23   BUN 25.7* 26.8* 33.7*   CR 1.12* 1.25* 1.21*   ANIONGAP 14 13 14   NIK 9.9 10.1 9.6   GLC 97 125* 95       ASSESSMENT/PLAN:  (I10) Benign essential hypertension  (primary encounter diagnosis)  Comment: BP stable. Recent decrease in norvasc dose  Plan: 1. Discontinue norvasc  2. Cont. Cardizem, toprol xl  3. Follow Bps, Hrs  4. Cbc, bmp in next 1-3 mos    (R11.2,  R19.7) Nausea, vomiting and diarrhea  Comment: currently resolved, suspect viral gastroenteritis. C-diff neg. 3/6/25  Plan: 1. Encourage fluids  2. Monitor for n/v/d    (I50.32) Chronic heart failure with preserved ejection fraction (H)  Comment: LE edema stable. Resp. Status stable  Plan: 1. Cont. Lasix  2. Follow wt.s  3. Elevate " Les  4. Monitor for sob, follow O2 sats              Electronically signed by:  DOLLY Moore CNP

## 2025-03-24 RX ORDER — AMLODIPINE BESYLATE 2.5 MG/1
2.5 TABLET ORAL DAILY
Qty: 90 TABLET | Refills: 3 | OUTPATIENT
Start: 2025-03-24

## 2025-09-01 ENCOUNTER — LAB REQUISITION (OUTPATIENT)
Dept: LAB | Facility: CLINIC | Age: OVER 89
End: 2025-09-01
Payer: COMMERCIAL

## 2025-09-01 DIAGNOSIS — I10 ESSENTIAL (PRIMARY) HYPERTENSION: ICD-10-CM

## 2025-09-01 DIAGNOSIS — F51.01 PRIMARY INSOMNIA: ICD-10-CM

## 2025-09-01 DIAGNOSIS — M15.0 PRIMARY GENERALIZED (OSTEO)ARTHRITIS: ICD-10-CM

## 2025-09-02 LAB
ANION GAP SERPL CALCULATED.3IONS-SCNC: 14 MMOL/L (ref 7–15)
BUN SERPL-MCNC: 30.7 MG/DL (ref 8–23)
CALCIUM SERPL-MCNC: 9.4 MG/DL (ref 8.8–10.4)
CHLORIDE SERPL-SCNC: 104 MMOL/L (ref 98–107)
CREAT SERPL-MCNC: 1.46 MG/DL (ref 0.51–0.95)
EGFRCR SERPLBLD CKD-EPI 2021: 33 ML/MIN/1.73M2
ERYTHROCYTE [DISTWIDTH] IN BLOOD BY AUTOMATED COUNT: 14.3 % (ref 10–15)
GLUCOSE SERPL-MCNC: 138 MG/DL (ref 70–99)
HCO3 SERPL-SCNC: 22 MMOL/L (ref 22–29)
HCT VFR BLD AUTO: 37.1 % (ref 35–47)
HGB BLD-MCNC: 12.2 G/DL (ref 11.7–15.7)
MCH RBC QN AUTO: 32 PG (ref 26.5–33)
MCHC RBC AUTO-ENTMCNC: 32.9 G/DL (ref 31.5–36.5)
MCV RBC AUTO: 97.4 FL (ref 78–100)
PLATELET # BLD AUTO: 259 10E3/UL (ref 150–450)
POTASSIUM SERPL-SCNC: 4.4 MMOL/L (ref 3.4–5.3)
RBC # BLD AUTO: 3.81 10E6/UL (ref 3.8–5.2)
SODIUM SERPL-SCNC: 140 MMOL/L (ref 135–145)
WBC # BLD AUTO: 8.88 10E3/UL (ref 4–11)